# Patient Record
Sex: FEMALE | Race: WHITE | NOT HISPANIC OR LATINO | URBAN - METROPOLITAN AREA
[De-identification: names, ages, dates, MRNs, and addresses within clinical notes are randomized per-mention and may not be internally consistent; named-entity substitution may affect disease eponyms.]

---

## 2022-09-10 ENCOUNTER — EMERGENCY (EMERGENCY)
Facility: HOSPITAL | Age: 22
LOS: 1 days | Discharge: HOME | End: 2022-09-10
Attending: EMERGENCY MEDICINE | Admitting: EMERGENCY MEDICINE
Payer: COMMERCIAL

## 2022-09-10 VITALS
DIASTOLIC BLOOD PRESSURE: 46 MMHG | SYSTOLIC BLOOD PRESSURE: 81 MMHG | OXYGEN SATURATION: 98 % | HEART RATE: 77 BPM | RESPIRATION RATE: 18 BRPM

## 2022-09-10 DIAGNOSIS — F10.129 ALCOHOL ABUSE WITH INTOXICATION, UNSPECIFIED: ICD-10-CM
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
## 2022-09-10 DIAGNOSIS — R55 SYNCOPE AND COLLAPSE: ICD-10-CM
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
## 2022-09-10 PROCEDURE — 99284 EMERGENCY DEPT VISIT MOD MDM: CPT

## 2022-09-10 NOTE — ED ADULT TRIAGE NOTE - ARRIVAL INFO ADDITIONAL COMMENTS
pt was at a dinner celebration with her family tonight and drank many shots (per her mother) and when she got up to go to the  she passed out and they could not wake her up ,.

## 2022-09-11 VITALS — DIASTOLIC BLOOD PRESSURE: 61 MMHG | HEART RATE: 80 BPM | SYSTOLIC BLOOD PRESSURE: 98 MMHG

## 2022-09-11 PROCEDURE — 99285 EMERGENCY DEPT VISIT HI MDM: CPT

## 2022-09-11 PROCEDURE — 82962 GLUCOSE BLOOD TEST: CPT

## 2022-09-11 RX ORDER — SODIUM CHLORIDE 9 MG/ML
1000 INJECTION INTRAMUSCULAR; INTRAVENOUS; SUBCUTANEOUS ONCE
Refills: 0 | Status: COMPLETED | OUTPATIENT
Start: 2022-09-11 | End: 2022-09-11

## 2022-09-11 RX ADMIN — SODIUM CHLORIDE 1000 MILLILITER(S): 9 INJECTION INTRAMUSCULAR; INTRAVENOUS; SUBCUTANEOUS at 00:50

## 2022-09-11 NOTE — ED PROVIDER NOTE - CLINICAL SUMMARY MEDICAL DECISION MAKING FREE TEXT BOX
Patient demonstrates clinical evidence for alcohol intoxication.  Patient admits to intentional heavy drinking  of alcohol and denies fall or injury.  Patient also denies drug use.  Some of the history and physicial exam is limited due to the state of intoxication.  All clothes were removed, all parts of body were evaluated and there is no evidence of acute trauma.  Plan is to observe patient in the ED until clinical sobriety is reached and reassess history and physical examination. discharged home with family after IV fluids given and pt clinically sober enough to be discharged safely to care of family.

## 2022-09-11 NOTE — ED PROVIDER NOTE - OBJECTIVE STATEMENT
23yo F here for etoh intox after family called 911 @ a family party when pt. went to go stand from sitting position and fell backwards, passing out / syncope, and they were unable to wake her up or arouse her. Pt. reported to have been drinking "a lot" with "several shots , maybe 7-10 drinks" as per mom @ bedside.  Pt. hypotensive on arrival with sbp 80s. Pt. improved once in ED. Pt. vomited in EMS ride on way here. No other trauma or injuries. Family at bedside.

## 2022-09-11 NOTE — ED ADULT NURSE NOTE - OBJECTIVE STATEMENT
pt. a&ox4 bibems after family called 911 @ a family party when pt. went to go stand from sitting position and fell backwards, passing out / syncope, and they were unable to wake her up or arouse her. Pt. reported to have been drinking "a lot" with "several shots , maybe 7-10 drinks" as per mom @ bedside. Pt. airway patent, breathing is spontaneous and unlabored. Pt. hypotensive on arrival with sbp 80s. Pt. improved once in ED. Pt. vomited in EMS ride on way here. Pt. responsive to verbal, pain. Pupils PERRLA and dilated on exam. Pt. able to wake up during pt. interview and respond to questions ; denies acute pain.

## 2022-09-11 NOTE — ED ADULT NURSE NOTE - NSIMPLEMENTINTERV_GEN_ALL_ED
Implemented All Fall Risk Interventions:  [place] to call system. Call bell, personal items and telephone within reach. Instruct patient to call for assistance. Room bathroom lighting operational. Non-slip footwear when patient is off stretcher. Physically safe environment: no spills, clutter or unnecessary equipment. Stretcher in lowest position, wheels locked, appropriate side rails in place. Provide visual cue, wrist band, yellow gown, etc. Monitor gait and stability. Monitor for mental status changes and reorient to person, place, and time. Review medications for side effects contributing to fall risk. Reinforce activity limits and safety measures with patient and family.